# Patient Record
Sex: FEMALE | Race: WHITE | Employment: FULL TIME | URBAN - METROPOLITAN AREA
[De-identification: names, ages, dates, MRNs, and addresses within clinical notes are randomized per-mention and may not be internally consistent; named-entity substitution may affect disease eponyms.]

---

## 2021-07-14 LAB
C. TRACHOMATIS, EXTERNAL RESULT: NORMAL
N. GONORRHOEAE, EXTERNAL RESULT: NORMAL

## 2021-08-17 LAB
ABO, EXTERNAL RESULT: NORMAL
HEP B, EXTERNAL RESULT: NORMAL
HEPATITIS C ANTIBODY, EXTERNAL RESULT: NORMAL
HIV, EXTERNAL RESULT: NORMAL
RH FACTOR, EXTERNAL RESULT: NORMAL
RUBELLA TITER, EXTERNAL RESULT: NORMAL

## 2021-11-22 LAB — RPR, EXTERNAL RESULT: NORMAL

## 2022-03-03 ENCOUNTER — ANESTHESIA (OUTPATIENT)
Dept: LABOR AND DELIVERY | Age: 32
End: 2022-03-03
Payer: COMMERCIAL

## 2022-03-03 ENCOUNTER — ANESTHESIA EVENT (OUTPATIENT)
Dept: LABOR AND DELIVERY | Age: 32
End: 2022-03-03
Payer: COMMERCIAL

## 2022-03-03 ENCOUNTER — HOSPITAL ENCOUNTER (INPATIENT)
Age: 32
LOS: 3 days | Discharge: HOME | End: 2022-03-06
Attending: OBSTETRICS & GYNECOLOGY | Admitting: OBSTETRICS & GYNECOLOGY
Payer: COMMERCIAL

## 2022-03-03 VITALS — DIASTOLIC BLOOD PRESSURE: 64 MMHG | OXYGEN SATURATION: 100 % | SYSTOLIC BLOOD PRESSURE: 113 MMHG

## 2022-03-03 DIAGNOSIS — Z98.891 S/P PRIMARY LOW TRANSVERSE C-SECTION: Primary | ICD-10-CM

## 2022-03-03 LAB
ABO/RH: NORMAL
AMPHETAMINE SCREEN, URINE: NORMAL
ANTIBODY SCREEN: NORMAL
BARBITURATE SCREEN URINE: NORMAL
BASOPHILS ABSOLUTE: 0.1 K/UL (ref 0–0.2)
BASOPHILS RELATIVE PERCENT: 0.6 %
BENZODIAZEPINE SCREEN, URINE: NORMAL
BUPRENORPHINE URINE: NORMAL
CANNABINOID SCREEN URINE: NORMAL
COCAINE METABOLITE SCREEN URINE: NORMAL
EOSINOPHILS ABSOLUTE: 0.1 K/UL (ref 0–0.6)
EOSINOPHILS RELATIVE PERCENT: 0.7 %
HCT VFR BLD CALC: 35.9 % (ref 36–48)
HEMOGLOBIN: 12.4 G/DL (ref 12–16)
LYMPHOCYTES ABSOLUTE: 1.5 K/UL (ref 1–5.1)
LYMPHOCYTES RELATIVE PERCENT: 16.7 %
Lab: NORMAL
MCH RBC QN AUTO: 32.8 PG (ref 26–34)
MCHC RBC AUTO-ENTMCNC: 34.6 G/DL (ref 31–36)
MCV RBC AUTO: 95 FL (ref 80–100)
METHADONE SCREEN, URINE: NORMAL
MONOCYTES ABSOLUTE: 0.9 K/UL (ref 0–1.3)
MONOCYTES RELATIVE PERCENT: 10.6 %
NEUTROPHILS ABSOLUTE: 6.4 K/UL (ref 1.7–7.7)
NEUTROPHILS RELATIVE PERCENT: 71.4 %
OPIATE SCREEN URINE: NORMAL
OXYCODONE URINE: NORMAL
PDW BLD-RTO: 12.9 % (ref 12.4–15.4)
PH UA: 6
PHENCYCLIDINE SCREEN URINE: NORMAL
PLATELET # BLD: 164 K/UL (ref 135–450)
PMV BLD AUTO: 9.2 FL (ref 5–10.5)
PROPOXYPHENE SCREEN: NORMAL
RBC # BLD: 3.78 M/UL (ref 4–5.2)
SARS-COV-2, NAAT: NOT DETECTED
TOTAL SYPHILLIS IGG/IGM: NORMAL
WBC # BLD: 9 K/UL (ref 4–11)

## 2022-03-03 PROCEDURE — 3609079900 HC CESAREAN SECTION: Performed by: OBSTETRICS & GYNECOLOGY

## 2022-03-03 PROCEDURE — 6370000000 HC RX 637 (ALT 250 FOR IP): Performed by: OBSTETRICS & GYNECOLOGY

## 2022-03-03 PROCEDURE — 85025 COMPLETE CBC W/AUTO DIFF WBC: CPT

## 2022-03-03 PROCEDURE — 3700000000 HC ANESTHESIA ATTENDED CARE: Performed by: OBSTETRICS & GYNECOLOGY

## 2022-03-03 PROCEDURE — 6360000002 HC RX W HCPCS: Performed by: ANESTHESIOLOGY

## 2022-03-03 PROCEDURE — 2580000003 HC RX 258: Performed by: OBSTETRICS & GYNECOLOGY

## 2022-03-03 PROCEDURE — 96372 THER/PROPH/DIAG INJ SC/IM: CPT

## 2022-03-03 PROCEDURE — 6360000002 HC RX W HCPCS: Performed by: OBSTETRICS & GYNECOLOGY

## 2022-03-03 PROCEDURE — 86780 TREPONEMA PALLIDUM: CPT

## 2022-03-03 PROCEDURE — 86900 BLOOD TYPING SEROLOGIC ABO: CPT

## 2022-03-03 PROCEDURE — 86850 RBC ANTIBODY SCREEN: CPT

## 2022-03-03 PROCEDURE — 59025 FETAL NON-STRESS TEST: CPT

## 2022-03-03 PROCEDURE — 2500000003 HC RX 250 WO HCPCS: Performed by: NURSE ANESTHETIST, CERTIFIED REGISTERED

## 2022-03-03 PROCEDURE — 2709999900 HC NON-CHARGEABLE SUPPLY: Performed by: OBSTETRICS & GYNECOLOGY

## 2022-03-03 PROCEDURE — 7100000001 HC PACU RECOVERY - ADDTL 15 MIN: Performed by: OBSTETRICS & GYNECOLOGY

## 2022-03-03 PROCEDURE — 87635 SARS-COV-2 COVID-19 AMP PRB: CPT

## 2022-03-03 PROCEDURE — 2709999900 HC NON-CHARGEABLE SUPPLY

## 2022-03-03 PROCEDURE — 96374 THER/PROPH/DIAG INJ IV PUSH: CPT

## 2022-03-03 PROCEDURE — 1220000000 HC SEMI PRIVATE OB R&B

## 2022-03-03 PROCEDURE — 80307 DRUG TEST PRSMV CHEM ANLYZR: CPT

## 2022-03-03 PROCEDURE — 3700000001 HC ADD 15 MINUTES (ANESTHESIA): Performed by: OBSTETRICS & GYNECOLOGY

## 2022-03-03 PROCEDURE — 2500000003 HC RX 250 WO HCPCS: Performed by: OBSTETRICS & GYNECOLOGY

## 2022-03-03 PROCEDURE — 6360000002 HC RX W HCPCS: Performed by: NURSE ANESTHETIST, CERTIFIED REGISTERED

## 2022-03-03 PROCEDURE — 7100000000 HC PACU RECOVERY - FIRST 15 MIN: Performed by: OBSTETRICS & GYNECOLOGY

## 2022-03-03 PROCEDURE — 2580000003 HC RX 258: Performed by: NURSE ANESTHETIST, CERTIFIED REGISTERED

## 2022-03-03 PROCEDURE — 86901 BLOOD TYPING SEROLOGIC RH(D): CPT

## 2022-03-03 RX ORDER — NICOTINE 21 MG/24HR
1 PATCH, TRANSDERMAL 24 HOURS TRANSDERMAL DAILY
Status: DISCONTINUED | OUTPATIENT
Start: 2022-03-03 | End: 2022-03-06 | Stop reason: HOSPADM

## 2022-03-03 RX ORDER — BUPIVACAINE HYDROCHLORIDE 7.5 MG/ML
INJECTION, SOLUTION INTRASPINAL PRN
Status: DISCONTINUED | OUTPATIENT
Start: 2022-03-03 | End: 2022-03-03 | Stop reason: SDUPTHER

## 2022-03-03 RX ORDER — SODIUM CHLORIDE 9 MG/ML
25 INJECTION, SOLUTION INTRAVENOUS PRN
Status: DISCONTINUED | OUTPATIENT
Start: 2022-03-03 | End: 2022-03-06 | Stop reason: HOSPADM

## 2022-03-03 RX ORDER — LANOLIN ALCOHOL/MO/W.PET/CERES
1000 CREAM (GRAM) TOPICAL DAILY
COMMUNITY

## 2022-03-03 RX ORDER — SODIUM CHLORIDE 0.9 % (FLUSH) 0.9 %
5-40 SYRINGE (ML) INJECTION PRN
Status: DISCONTINUED | OUTPATIENT
Start: 2022-03-03 | End: 2022-03-06 | Stop reason: HOSPADM

## 2022-03-03 RX ORDER — SODIUM CHLORIDE, SODIUM LACTATE, POTASSIUM CHLORIDE, AND CALCIUM CHLORIDE .6; .31; .03; .02 G/100ML; G/100ML; G/100ML; G/100ML
1000 INJECTION, SOLUTION INTRAVENOUS PRN
Status: DISCONTINUED | OUTPATIENT
Start: 2022-03-03 | End: 2022-03-06 | Stop reason: HOSPADM

## 2022-03-03 RX ORDER — IBUPROFEN 800 MG/1
800 TABLET ORAL EVERY 8 HOURS PRN
Status: DISCONTINUED | OUTPATIENT
Start: 2022-03-03 | End: 2022-03-06 | Stop reason: HOSPADM

## 2022-03-03 RX ORDER — MULTIVIT-MIN/IRON/FOLIC ACID/K 18-600-40
CAPSULE ORAL
COMMUNITY

## 2022-03-03 RX ORDER — ONDANSETRON 2 MG/ML
4 INJECTION INTRAMUSCULAR; INTRAVENOUS EVERY 6 HOURS PRN
Status: DISCONTINUED | OUTPATIENT
Start: 2022-03-03 | End: 2022-03-06 | Stop reason: HOSPADM

## 2022-03-03 RX ORDER — FENTANYL CITRATE 50 UG/ML
INJECTION, SOLUTION INTRAMUSCULAR; INTRAVENOUS PRN
Status: DISCONTINUED | OUTPATIENT
Start: 2022-03-03 | End: 2022-03-03 | Stop reason: SDUPTHER

## 2022-03-03 RX ORDER — MORPHINE SULFATE 10 MG/ML
INJECTION, SOLUTION INTRAMUSCULAR; INTRAVENOUS PRN
Status: DISCONTINUED | OUTPATIENT
Start: 2022-03-03 | End: 2022-03-03 | Stop reason: SDUPTHER

## 2022-03-03 RX ORDER — KETOROLAC TROMETHAMINE 30 MG/ML
INJECTION, SOLUTION INTRAMUSCULAR; INTRAVENOUS PRN
Status: DISCONTINUED | OUTPATIENT
Start: 2022-03-03 | End: 2022-03-03 | Stop reason: SDUPTHER

## 2022-03-03 RX ORDER — OXYCODONE HYDROCHLORIDE AND ACETAMINOPHEN 5; 325 MG/1; MG/1
1 TABLET ORAL EVERY 4 HOURS PRN
Status: DISCONTINUED | OUTPATIENT
Start: 2022-03-03 | End: 2022-03-06 | Stop reason: HOSPADM

## 2022-03-03 RX ORDER — SODIUM CHLORIDE 0.9 % (FLUSH) 0.9 %
5-40 SYRINGE (ML) INJECTION EVERY 12 HOURS SCHEDULED
Status: DISCONTINUED | OUTPATIENT
Start: 2022-03-03 | End: 2022-03-06 | Stop reason: HOSPADM

## 2022-03-03 RX ORDER — SIMETHICONE 80 MG
80 TABLET,CHEWABLE ORAL EVERY 6 HOURS PRN
Status: DISCONTINUED | OUTPATIENT
Start: 2022-03-03 | End: 2022-03-06 | Stop reason: HOSPADM

## 2022-03-03 RX ORDER — NALOXONE HYDROCHLORIDE 0.4 MG/ML
0.4 INJECTION, SOLUTION INTRAMUSCULAR; INTRAVENOUS; SUBCUTANEOUS PRN
Status: DISCONTINUED | OUTPATIENT
Start: 2022-03-03 | End: 2022-03-06 | Stop reason: HOSPADM

## 2022-03-03 RX ORDER — EPHEDRINE SULFATE/0.9% NACL/PF 50 MG/5 ML
SYRINGE (ML) INTRAVENOUS PRN
Status: DISCONTINUED | OUTPATIENT
Start: 2022-03-03 | End: 2022-03-03 | Stop reason: SDUPTHER

## 2022-03-03 RX ORDER — DIPHENHYDRAMINE HYDROCHLORIDE 50 MG/ML
25 INJECTION INTRAMUSCULAR; INTRAVENOUS EVERY 6 HOURS PRN
Status: DISCONTINUED | OUTPATIENT
Start: 2022-03-03 | End: 2022-03-06 | Stop reason: HOSPADM

## 2022-03-03 RX ORDER — PRENATAL VIT/IRON FUM/FOLIC AC 27MG-0.8MG
1 TABLET ORAL DAILY
Status: DISCONTINUED | OUTPATIENT
Start: 2022-03-03 | End: 2022-03-06 | Stop reason: HOSPADM

## 2022-03-03 RX ORDER — DOCUSATE SODIUM 100 MG/1
100 CAPSULE, LIQUID FILLED ORAL 2 TIMES DAILY
Status: DISCONTINUED | OUTPATIENT
Start: 2022-03-03 | End: 2022-03-03

## 2022-03-03 RX ORDER — SODIUM CHLORIDE, SODIUM LACTATE, POTASSIUM CHLORIDE, AND CALCIUM CHLORIDE .6; .31; .03; .02 G/100ML; G/100ML; G/100ML; G/100ML
500 INJECTION, SOLUTION INTRAVENOUS PRN
Status: DISCONTINUED | OUTPATIENT
Start: 2022-03-03 | End: 2022-03-06 | Stop reason: HOSPADM

## 2022-03-03 RX ORDER — NALBUPHINE HCL 10 MG/ML
5 AMPUL (ML) INJECTION EVERY 4 HOURS PRN
Status: DISCONTINUED | OUTPATIENT
Start: 2022-03-03 | End: 2022-03-06 | Stop reason: HOSPADM

## 2022-03-03 RX ORDER — DOCUSATE SODIUM 100 MG/1
100 CAPSULE, LIQUID FILLED ORAL 2 TIMES DAILY
Status: DISCONTINUED | OUTPATIENT
Start: 2022-03-03 | End: 2022-03-06 | Stop reason: HOSPADM

## 2022-03-03 RX ORDER — METHYLERGONOVINE MALEATE 0.2 MG/ML
200 INJECTION INTRAVENOUS PRN
Status: DISCONTINUED | OUTPATIENT
Start: 2022-03-03 | End: 2022-03-06 | Stop reason: HOSPADM

## 2022-03-03 RX ORDER — KETOROLAC TROMETHAMINE 30 MG/ML
30 INJECTION, SOLUTION INTRAMUSCULAR; INTRAVENOUS EVERY 6 HOURS
Status: COMPLETED | OUTPATIENT
Start: 2022-03-03 | End: 2022-03-03

## 2022-03-03 RX ORDER — SODIUM CHLORIDE, SODIUM LACTATE, POTASSIUM CHLORIDE, CALCIUM CHLORIDE 600; 310; 30; 20 MG/100ML; MG/100ML; MG/100ML; MG/100ML
INJECTION, SOLUTION INTRAVENOUS CONTINUOUS
Status: DISCONTINUED | OUTPATIENT
Start: 2022-03-03 | End: 2022-03-06 | Stop reason: HOSPADM

## 2022-03-03 RX ORDER — SODIUM CHLORIDE, SODIUM LACTATE, POTASSIUM CHLORIDE, CALCIUM CHLORIDE 600; 310; 30; 20 MG/100ML; MG/100ML; MG/100ML; MG/100ML
INJECTION, SOLUTION INTRAVENOUS CONTINUOUS PRN
Status: DISCONTINUED | OUTPATIENT
Start: 2022-03-03 | End: 2022-03-03 | Stop reason: SDUPTHER

## 2022-03-03 RX ORDER — LANOLIN 100 %
OINTMENT (GRAM) TOPICAL
Status: DISCONTINUED | OUTPATIENT
Start: 2022-03-03 | End: 2022-03-06 | Stop reason: HOSPADM

## 2022-03-03 RX ORDER — OXYCODONE HYDROCHLORIDE AND ACETAMINOPHEN 5; 325 MG/1; MG/1
2 TABLET ORAL EVERY 4 HOURS PRN
Status: DISCONTINUED | OUTPATIENT
Start: 2022-03-03 | End: 2022-03-06 | Stop reason: HOSPADM

## 2022-03-03 RX ORDER — CALCIUM CARBONATE 500(1250)
500 TABLET ORAL DAILY
COMMUNITY

## 2022-03-03 RX ORDER — ONDANSETRON 2 MG/ML
INJECTION INTRAMUSCULAR; INTRAVENOUS PRN
Status: DISCONTINUED | OUTPATIENT
Start: 2022-03-03 | End: 2022-03-03 | Stop reason: SDUPTHER

## 2022-03-03 RX ORDER — OXYTOCIN 10 [USP'U]/ML
INJECTION, SOLUTION INTRAMUSCULAR; INTRAVENOUS PRN
Status: DISCONTINUED | OUTPATIENT
Start: 2022-03-03 | End: 2022-03-03 | Stop reason: SDUPTHER

## 2022-03-03 RX ADMIN — ONDANSETRON 4 MG: 2 INJECTION INTRAMUSCULAR; INTRAVENOUS at 05:00

## 2022-03-03 RX ADMIN — SODIUM CHLORIDE, SODIUM LACTATE, POTASSIUM CHLORIDE, AND CALCIUM CHLORIDE: .6; .31; .03; .02 INJECTION, SOLUTION INTRAVENOUS at 04:38

## 2022-03-03 RX ADMIN — SODIUM CHLORIDE, PRESERVATIVE FREE 10 ML: 5 INJECTION INTRAVENOUS at 23:12

## 2022-03-03 RX ADMIN — Medication 10 MG: at 05:04

## 2022-03-03 RX ADMIN — KETOROLAC TROMETHAMINE 30 MG: 30 INJECTION, SOLUTION INTRAMUSCULAR at 05:35

## 2022-03-03 RX ADMIN — NALBUPHINE HYDROCHLORIDE 5 MG: 10 INJECTION, SOLUTION INTRAMUSCULAR; INTRAVENOUS; SUBCUTANEOUS at 06:55

## 2022-03-03 RX ADMIN — CEFAZOLIN 2000 MG: 10 INJECTION, POWDER, FOR SOLUTION INTRAVENOUS at 04:38

## 2022-03-03 RX ADMIN — KETOROLAC TROMETHAMINE 30 MG: 30 INJECTION, SOLUTION INTRAMUSCULAR; INTRAVENOUS at 23:09

## 2022-03-03 RX ADMIN — Medication 10 MG: at 05:00

## 2022-03-03 RX ADMIN — DIPHENHYDRAMINE HYDROCHLORIDE 25 MG: 50 INJECTION INTRAMUSCULAR; INTRAVENOUS at 13:01

## 2022-03-03 RX ADMIN — SODIUM CHLORIDE, POTASSIUM CHLORIDE, SODIUM LACTATE AND CALCIUM CHLORIDE: 600; 310; 30; 20 INJECTION, SOLUTION INTRAVENOUS at 10:31

## 2022-03-03 RX ADMIN — BUPIVACAINE HYDROCHLORIDE IN DEXTROSE 1.7 ML: 7.5 INJECTION, SOLUTION SUBARACHNOID at 04:48

## 2022-03-03 RX ADMIN — OXYTOCIN 20 UNITS: 10 INJECTION, SOLUTION INTRAMUSCULAR; INTRAVENOUS at 05:13

## 2022-03-03 RX ADMIN — MORPHINE SULFATE 0.2 MG: 10 INJECTION INTRAVENOUS at 04:48

## 2022-03-03 RX ADMIN — HYDROMORPHONE HYDROCHLORIDE 1 MG: 1 INJECTION, SOLUTION INTRAMUSCULAR; INTRAVENOUS; SUBCUTANEOUS at 07:34

## 2022-03-03 RX ADMIN — FAMOTIDINE 20 MG: 10 INJECTION, SOLUTION INTRAVENOUS at 03:58

## 2022-03-03 RX ADMIN — Medication 87.3 MILLI-UNITS/MIN: at 06:06

## 2022-03-03 RX ADMIN — DOCUSATE SODIUM 100 MG: 100 CAPSULE, LIQUID FILLED ORAL at 10:07

## 2022-03-03 RX ADMIN — KETOROLAC TROMETHAMINE 30 MG: 30 INJECTION, SOLUTION INTRAMUSCULAR; INTRAVENOUS at 10:37

## 2022-03-03 RX ADMIN — PRENATAL VIT W/ FE FUMARATE-FA TAB 27-0.8 MG 1 TABLET: 27-0.8 TAB at 10:07

## 2022-03-03 RX ADMIN — SODIUM CHLORIDE, POTASSIUM CHLORIDE, SODIUM LACTATE AND CALCIUM CHLORIDE: 600; 310; 30; 20 INJECTION, SOLUTION INTRAVENOUS at 06:03

## 2022-03-03 RX ADMIN — DOCUSATE SODIUM 100 MG: 100 CAPSULE, LIQUID FILLED ORAL at 23:10

## 2022-03-03 RX ADMIN — ENOXAPARIN SODIUM 40 MG: 100 INJECTION SUBCUTANEOUS at 18:21

## 2022-03-03 RX ADMIN — ONDANSETRON 4 MG: 2 INJECTION INTRAMUSCULAR; INTRAVENOUS at 11:46

## 2022-03-03 RX ADMIN — FENTANYL CITRATE 10 MCG: 50 INJECTION INTRAMUSCULAR; INTRAVENOUS at 04:48

## 2022-03-03 RX ADMIN — KETOROLAC TROMETHAMINE 30 MG: 30 INJECTION, SOLUTION INTRAMUSCULAR; INTRAVENOUS at 16:52

## 2022-03-03 RX ADMIN — SODIUM CHLORIDE, SODIUM LACTATE, POTASSIUM CHLORIDE, AND CALCIUM CHLORIDE: .6; .31; .03; .02 INJECTION, SOLUTION INTRAVENOUS at 05:46

## 2022-03-03 ASSESSMENT — PULMONARY FUNCTION TESTS
PIF_VALUE: 0
PIF_VALUE: 1
PIF_VALUE: 0

## 2022-03-03 ASSESSMENT — PAIN DESCRIPTION - DESCRIPTORS: DESCRIPTORS: ACHING;BURNING;CRAMPING

## 2022-03-03 ASSESSMENT — PAIN SCALES - GENERAL
PAINLEVEL_OUTOF10: 5
PAINLEVEL_OUTOF10: 5
PAINLEVEL_OUTOF10: 6
PAINLEVEL_OUTOF10: 3
PAINLEVEL_OUTOF10: 3
PAINLEVEL_OUTOF10: 5

## 2022-03-03 ASSESSMENT — PAIN - FUNCTIONAL ASSESSMENT: PAIN_FUNCTIONAL_ASSESSMENT: ACTIVITIES ARE NOT PREVENTED

## 2022-03-03 ASSESSMENT — PAIN DESCRIPTION - PROGRESSION: CLINICAL_PROGRESSION: NOT CHANGED

## 2022-03-03 ASSESSMENT — PAIN DESCRIPTION - ORIENTATION: ORIENTATION: LEFT;LOWER

## 2022-03-03 ASSESSMENT — PAIN DESCRIPTION - PAIN TYPE: TYPE: ACUTE PAIN;SURGICAL PAIN

## 2022-03-03 ASSESSMENT — PAIN DESCRIPTION - LOCATION: LOCATION: ABDOMEN

## 2022-03-03 ASSESSMENT — PAIN DESCRIPTION - ONSET: ONSET: ON-GOING

## 2022-03-03 ASSESSMENT — PAIN DESCRIPTION - FREQUENCY: FREQUENCY: CONTINUOUS

## 2022-03-03 NOTE — FLOWSHEET NOTE
Report given to DENICE Richardson RNC from this nurse and Lynda Gonzales RN. Care turned over at this time.

## 2022-03-03 NOTE — ANESTHESIA PROCEDURE NOTES
Spinal Block    Patient location during procedure: OR  Start time: 3/3/2022 4:40 AM  End time: 3/3/2022 4:59 AM  Reason for block: primary anesthetic  Staffing  Performed: resident/CRNA   Anesthesiologist: Himanshu Proctor MD  Resident/CRNA: PANCHITO Vargas CRNA  Preanesthetic Checklist  Completed: patient identified, IV checked, site marked, risks and benefits discussed, surgical consent, monitors and equipment checked, pre-op evaluation, timeout performed, anesthesia consent given, oxygen available and patient being monitored  Spinal Block  Patient position: sitting  Prep: ChloraPrep and site prepped and draped  Patient monitoring: frequent blood pressure checks and continuous pulse ox  Approach: midline  Location: L2/L3  Provider prep: mask and sterile gloves  Local infiltration: lidocaine  Agent: bupivacaine  Adjuvant medications: duramorph 0.2mg/fentanyl 10mcg. Dose: 1.7  Dose: 1.7  Needle  Needle type:  Jenny   Needle gauge: 25 G  Needle length: 3.5 in  Assessment  Sensory level: T4  Swirl obtained: Yes  CSF: clear  Attempts: 1  Hemodynamics: stable

## 2022-03-03 NOTE — FLOWSHEET NOTE
Dr Tanna Best in, bedside ultrasound performed to determine fetal position. Will proceed with primary C/S for breech presentation.

## 2022-03-03 NOTE — ANESTHESIA POSTPROCEDURE EVALUATION
Department of Anesthesiology  Postprocedure Note    Patient: Justyna Pham  MRN: [id number]  [place]: 1990  Date of evaluation: 3/3/2022  Time:  6:00 AM     Procedure Summary     Date: 22 Room / Location: [facility] [address]    Anesthesia Start: 338 Anesthesia Stop: Karissa Budds    Procedure: PRIMARY  SECTION with low transverse uterine incision at 0511 (N/A ) Diagnosis: (Primary , , breech presentation, Gest.39)    Surgeons: Halle Gomez MD Responsible Provider: Kailyn Osborne MD    Anesthesia Type: spinal ASA Status: 1          Anesthesia Type: spinal    Vy Phase I:      Vy Phase II:      Last vitals: Reviewed and per EMR flowsheets.        Anesthesia Post Evaluation    Patient location during evaluation: PACU  Patient participation: complete - patient participated  Level of consciousness: awake and alert  Pain score: 0  Airway patency: patent  Nausea & Vomiting: no vomiting and no nausea  Complications: no  Cardiovascular status: blood pressure returned to baseline and hemodynamically stable  Respiratory status: acceptable and room air  Hydration status: stable

## 2022-03-03 NOTE — FLOWSHEET NOTE
Pt to [facility] triage with SROM at 0109 with clear fluid noted. Pt states feeling occasional mild irregular contraction, denies any vaginal bleeding. Pt states feeling good fetal movement. Pt placed on monitor, viewed at central station. Admission and initial assessment completed. SVE performed, cervix 1/60/-2 posterior. Dr Alfredo Renae notified of pt status, will be in to evaluate.

## 2022-03-03 NOTE — L&D DELIVERY NOTE
OPERATIVE NOTE    Date of surgery: 2022    Prenatal Care: Complicated by: 1. Rito breech presentation   Pre-operative Diagnosis: 1. IUP @ 39 0/7 w 2. Pre-labor PROM   Post-operative Diagnosis: the same   Procedure: Urgent primary   Anesthesia: Spinal  Surgeon: Dr Kylie Carias  Assistant: Hunter Andersen  Antibiotics: Ancef  : Male, weight: 3390 g, Apgars 9/9  Findings: Normal uterus, tubes, ovaries  Complications: None  Specimens: None  Urine Output: 50 ml, clear   Quantified blood loss: 655 ml    Indications: Patient is Armando Keita  is a 27 y.o.  female at 39w0d was admitted to L&D a few hours prior her planned CS due to pre-labor PROM. The risks, benefits and alternatives of  section were discussed with the patient, including but not limited to infection, allergic reaction, disfiguring scar, thrombosis, injury to bowel, bladder, fistula, injury to blood vessels, hysterectomy, injury to fetus, need for blood transfusion, pelvic pain, adhesive disease or scar tissue, embolism, herniation of incision site, and risk of repeat cesareans / trial of labor after . Patient elected to proceed, informed consent was signed. Procedure Details: The patient was taken to the operating room, placed in the supine position with the leftward tilt. Spinal anesthesia was found to be adequate. Garcia catheter was placed in the bladder. The patient was prepped and draped in the normal sterile fashion. Time out was performed, identifying correct patients name, date of birth, and type of the procedure. Pfannenstiel skin incision was made with the scalpel and carried down to the underlying fascia. The fascial was incised and incision was extended laterally with Guerrero scissors. The superior fascial incision was grasped with Kocher clamps, tented up, and the underlying rectus muscles were dissected bluntly.  The inferior edge of the rectus fascia was grasped with Kocher clamps, tented up, and the underlying rectus muscle was dissected off bluntly. The rectus muscle was divided in the midline bluntly. The peritoneum was entered bluntly with hemostat and extended inferiorly and superiorly with Metzenbaum scissors. The César retractor was then inserted. The vesicouterine peritoneum was identified, grasped with pick-ups and entered sharply with Metzenbaum scissors. The bladder flap was then created digitally. A low transverse uterine incision was made with the scalpel and extended laterally with blunt finger dissection. The baby was delivered atraumatically in breech presentation, following appropriate maneuvers. Single nuchal cord was released, the nose and mouth were suctioned. The cord was clamped and cut and the baby was handed off to the waiting nursing staff. Placenta was then delivered manually. The uterus was not  exteriorized and it was cleared of all clots and debris. The uterine incision was closed in two layers. The first layer with running locked layer of 0 Monocryl. The second layer was an imbricating layer of 0 Monocryl with good hemostasis assured. The paracolic gutters were cleaned with moistened laps, removing blood clots and debris. Good hemostasis was again reassured throughout. The César retractor was then removed from the abdomen. The peritoneum was closed with 3-0 Vicryl in a running fashion after first and second lap and instrument count were correct. The rectus muscles were approximated with 4 interrupted matress sutures of O-Vycril to prevent future diastasis. The fascia was closed with 0 Vicryl in a running fashion. Good hemostasis was assured. The subcuticular layers were irrigated with warm normal saline and bleeding controlled with Bovie cautery. The subcuticular layer was reapproximated with 3-0 Vicryl in interrupted fashion. The skin was closed with a 4-0 Vicryl in a subcuticular fashion. Derma bond was applied to skin. The patient tolerated the procedure well.  Sponge, lap, and needle counts were correct times three and the patient was taken to recovery in stable condition.     Electronically signed by Jules Mirza MD on 3/3/2022 at 5:55 AM

## 2022-03-03 NOTE — FLOWSHEET NOTE
Patient prepped for OR. Chlorhexidine wipes used to prep incision area. IV fluids running. Consents signed. Witnessed and placed in chart. All preop medications administered as prescribed. All patient questions and concerns addressed. Patient out of room to OR for primary  section per Dr Angela Parra at this time.

## 2022-03-03 NOTE — PLAN OF CARE
Problem: Pain:  Goal: Pain level will decrease  Description: Pain level will decrease  Outcome: Ongoing  Goal: Control of acute pain  Description: Control of acute pain  Outcome: Ongoing  Goal: Control of chronic pain  Description: Control of chronic pain  Outcome: Ongoing     Problem: Anxiety:  Goal: Level of anxiety will decrease  Description: Level of anxiety will decrease  Outcome: Ongoing     Problem: Aspiration - Risk of:  Goal: Absence of aspiration  Description: Absence of aspiration  Outcome: Ongoing     Problem: Tissue Perfusion - Uteroplacental, Altered:  Goal: Absence of abnormal fetal heart rate pattern  Description: Absence of abnormal fetal heart rate pattern  Outcome: Ongoing     Problem: Venous Thromboembolism - Risk of:  Goal: Will show no signs or symptoms of venous thromboembolism  Description: Will show no signs or symptoms of venous thromboembolism  Outcome: Ongoing     Problem: Discharge Planning:  Goal: Discharged to appropriate level of care  Description: Discharged to appropriate level of care  Outcome: Ongoing     Problem: Fluid Volume - Imbalance:  Goal: Absence of postpartum hemorrhage signs and symptoms  Description: Absence of postpartum hemorrhage signs and symptoms  Outcome: Ongoing  Goal: Absence of imbalanced fluid volume signs and symptoms  Description: Absence of imbalanced fluid volume signs and symptoms  Outcome: Ongoing     Problem: Infection - Surgical Site:  Goal: Will show no infection signs and symptoms  Description: Will show no infection signs and symptoms  Outcome: Ongoing     Problem: Mood - Altered:  Goal: Mood stable  Description: Mood stable  Outcome: Ongoing     Problem: Nausea/Vomiting:  Goal: Absence of nausea/vomiting  Description: Absence of nausea/vomiting  Outcome: Ongoing     Problem: Pain - Acute:  Goal: Pain level will decrease  Description: Pain level will decrease  Outcome: Ongoing     Problem: Urinary Retention:  Goal: Urinary elimination within

## 2022-03-03 NOTE — ANESTHESIA PRE PROCEDURE
Department of Anesthesiology  Preprocedure Note       Name:  Corrie North   Age:  27 y.o.  :  1990                                          MRN:  [id number]         Date:  3/3/2022      Surgeon: Toams Hernandez):  Jonh Quinn MD    Procedure: Procedure(s):   SECTION    Medications prior to admission:   Prior to Admission medications    Medication Sig Start Date End Date Taking? Authorizing Provider   Prenatal MV-Min-Fe Fum-FA-DHA (PRENATAL 1 PO) Take by mouth   Yes Historical Provider, MD   Cholecalciferol (VITAMIN D) 50 MCG ( UT) CAPS capsule Take by mouth   Yes Historical Provider, MD   vitamin B-12 (CYANOCOBALAMIN) 1000 MCG tablet Take 1,000 mcg by mouth daily   Yes Historical Provider, MD   calcium carbonate (OSCAL) 500 MG TABS tablet Take 500 mg by mouth daily   Yes Historical Provider, MD       Current medications:    Current Facility-Administered Medications   Medication Dose Route Frequency Provider Last Rate Last Admin    lactated ringers infusion   IntraVENous Continuous Inis Fails, MD        lactated ringers bolus  500 mL IntraVENous PRN Inis Fails, MD Ady Stokes lactated ringers bolus  1,000 mL IntraVENous PRN Inis Fails, MD        oxytocin (PITOCIN) 30 units in 500 mL infusion  87.3 walter-units/min IntraVENous Continuous PRN Inis Fails, MD        And    oxytocin (PITOCIN) 30 units in 500 mL infusion  10 Units IntraVENous PRN Inis Fails, MD        docusate sodium (COLACE) capsule 100 mg  100 mg Oral BID Inis Fails, MD        ceFAZolin (ANCEF) 2000 mg in dextrose 5 % 100 mL IVPB  2,000 mg IntraVENous Once Inis Fails, MD        famotidine (PEPCID) injection 20 mg  20 mg IntraVENous Once Inis Fails, MD           Allergies:  No Known Allergies    Problem List:    Patient Active Problem List   Diagnosis Code    S/P primary low transverse  Q71.894       Past Medical History:  History reviewed.  No pertinent past medical history. Past Surgical History:  History reviewed. No pertinent surgical history. Social History:    Social History     Tobacco Use    Smoking status: Former Smoker    Smokeless tobacco: Never Used   Substance Use Topics    Alcohol use: Not Currently                                Counseling given: Not Answered      Vital Signs (Current):   Vitals:    03/03/22 0306   BP: 119/76   Pulse: 59   Resp: 18   Temp: 36.6 °C (97.8 °F)   TempSrc: Oral   SpO2: 100%   Weight: 158 lb (71.7 kg)   Height: 5' 8\" (1.727 m)                                              BP Readings from Last 3 Encounters:   03/03/22 119/76       NPO Status:  Nothing since 2000 3-2-22                                                                               BMI:   Wt Readings from Last 3 Encounters:   03/03/22 158 lb (71.7 kg)     Body mass index is 24.02 kg/m². CBC:   Lab Results   Component Value Date    WBC 9.0 03/03/2022    RBC 3.78 03/03/2022    HGB 12.4 03/03/2022    HCT 35.9 03/03/2022    MCV 95.0 03/03/2022    RDW 12.9 03/03/2022     03/03/2022       CMP: No results found for: NA, K, CL, CO2, BUN, CREATININE, GFRAA, AGRATIO, LABGLOM, GLUCOSE, GLU, PROT, CALCIUM, BILITOT, ALKPHOS, AST, ALT    POC Tests: No results for input(s): POCGLU, POCNA, POCK, POCCL, POCBUN, POCHEMO, POCHCT in the last 72 hours.     Coags: No results found for: PROTIME, INR, APTT    HCG (If Applicable): No results found for: PREGTESTUR, PREGSERUM, HCG, HCGQUANT     ABGs: No results found for: PHART, PO2ART, OTW1PRV, SGR9YYN, BEART, W2NZIYVA     Type & Screen (If Applicable):  No results found for: LABABO, LABRH    Drug/Infectious Status (If Applicable):  No results found for: HIV, HEPCAB    COVID-19 Screening (If Applicable): No results found for: COVID19        Anesthesia Evaluation  Patient summary reviewed no history of anesthetic complications:   Airway: Mallampati: I  TM distance: >3 FB   Neck ROM: full  Mouth opening: > = 3 FB Dental: normal exam         Pulmonary:Negative Pulmonary ROS and normal exam                               Cardiovascular:Negative CV ROS  Exercise tolerance: good (>4 METS),           Rhythm: regular  Rate: normal           Beta Blocker:  Not on Beta Blocker         Neuro/Psych:                ROS comment: Some low back pain, prior injection for steroid with good result. No prior surgery. No numbness or pain since 2015 GI/Hepatic/Renal: Neg GI/Hepatic/Renal ROS            Endo/Other: Negative Endo/Other ROS                    Abdominal:             Vascular: negative vascular ROS. Other Findings:             Anesthesia Plan      spinal     ASA 1           MIPS: Postoperative opioids intended and Prophylactic antiemetics administered. Anesthetic plan and risks discussed with patient. Use of blood products discussed with patient whom consented to blood products. Plan discussed with attending.                   PANCHITO Key - CRNA   3/3/2022

## 2022-03-03 NOTE — FLOWSHEET NOTE
Nurse at bedside, IV pump alarming for PIT, bag has approximately 1/4 of bag left pump alarming infusion complete, per YAHIR Owens [facility] charge nurse to discontinue line and infusion is complete.

## 2022-03-03 NOTE — PLAN OF CARE
Problem: Pain:  Goal: Pain level will decrease  Description: Pain level will decrease  3/3/2022 0704 by Marylin Eldridge RN  Outcome: Completed  3/3/2022 0614 by Marylin Eldridge RN  Outcome: Ongoing  Goal: Control of acute pain  Description: Control of acute pain  3/3/2022 0704 by Marylin Eldridge RN  Outcome: Completed  3/3/2022 0614 by Marylin Eldridge RN  Outcome: Ongoing  Goal: Control of chronic pain  Description: Control of chronic pain  3/3/2022 0704 by Marylin Eldridge RN  Outcome: Completed  3/3/2022 0614 by Marylin Eldridge RN  Outcome: Ongoing     Problem: Anxiety:  Goal: Level of anxiety will decrease  Description: Level of anxiety will decrease  3/3/2022 0704 by Marylin Eldridge RN  Outcome: Completed  3/3/2022 0614 by Marylin Eldridge RN  Outcome: Ongoing     Problem: Aspiration - Risk of:  Goal: Absence of aspiration  Description: Absence of aspiration  3/3/2022 0704 by Marylin Eldridge RN  Outcome: Completed  3/3/2022 0614 by Marylin Eldridge RN  Outcome: Ongoing     Problem: Tissue Perfusion - Uteroplacental, Altered:  Goal: Absence of abnormal fetal heart rate pattern  Description: Absence of abnormal fetal heart rate pattern  3/3/2022 0704 by Marylin Eldridge RN  Outcome: Completed  3/3/2022 0614 by Marylin Eldridge RN  Outcome: Ongoing     Problem: Venous Thromboembolism - Risk of:  Goal: Will show no signs or symptoms of venous thromboembolism  Description: Will show no signs or symptoms of venous thromboembolism  3/3/2022 0704 by Marylin Eldridge RN  Outcome: Completed  3/3/2022 0614 by Marylin Eldridge RN  Outcome: Ongoing     Problem: Discharge Planning:  Goal: Discharged to appropriate level of care  Description: Discharged to appropriate level of care  3/3/2022 0704 by Marylin Eldridge RN  Outcome: Completed  3/3/2022 0614 by Marylin Eldridge RN  Outcome: Ongoing  Goal: Discharged to appropriate level of care  Description: Discharged to appropriate level of care  3/3/2022 1737 by Anish Cox RN  Outcome: Ongoing  3/3/2022 0900 by Bethany Richardson RN  Outcome: Ongoing     Problem: Fluid Volume - Imbalance:  Goal: Absence of postpartum hemorrhage signs and symptoms  Description: Absence of postpartum hemorrhage signs and symptoms  3/3/2022 0704 by Bello Ballesteros RN  Outcome: Completed  3/3/2022 0614 by Bello Ballesteros RN  Outcome: Ongoing  Goal: Absence of imbalanced fluid volume signs and symptoms  Description: Absence of imbalanced fluid volume signs and symptoms  3/3/2022 0704 by Bello Ballesteros RN  Outcome: Completed  3/3/2022 0614 by Bello Ballesteros RN  Outcome: Ongoing  Goal: Absence of postpartum hemorrhage signs and symptoms  Description: Absence of postpartum hemorrhage signs and symptoms  3/3/2022 1737 by Azael Muhammad RN  Outcome: Ongoing  3/3/2022 0900 by Giselle Lyons RN  Outcome: Ongoing  Goal: Absence of imbalanced fluid volume signs and symptoms  Description: Absence of imbalanced fluid volume signs and symptoms  3/3/2022 1737 by Azael Muhammad RN  Outcome: Ongoing  3/3/2022 0900 by Giselle Lyons RN  Outcome: Ongoing     Problem: Infection - Surgical Site:  Goal: Will show no infection signs and symptoms  Description: Will show no infection signs and symptoms  3/3/2022 0704 by Bello Ballesteros RN  Outcome: Completed  3/3/2022 0614 by Bello Ballesteros RN  Outcome: Ongoing  Goal: Will show no infection signs and symptoms  Description: Will show no infection signs and symptoms  3/3/2022 1737 by Azael Muhammad RN  Outcome: Ongoing  3/3/2022 0900 by Giselle Lyons RN  Outcome: Ongoing     Problem: Mood - Altered:  Goal: Mood stable  Description: Mood stable  3/3/2022 0704 by Bello Ballesteros RN  Outcome: Completed  3/3/2022 0614 by Bello Ballesteros RN  Outcome: Ongoing  Goal: Mood stable  Description: Mood stable  3/3/2022 1737 by Azael Muhammad RN  Outcome: Ongoing  3/3/2022 0900 by Giselle Lyons RN  Outcome: Ongoing     Problem: Nausea/Vomiting:  Goal: Absence of nausea/vomiting  Description: Absence of nausea/vomiting  3/3/2022 0704 by Bello Ballesteros RN  Outcome: Completed  3/3/2022 0614 by Dana Storm RN  Outcome: Ongoing  Goal: Absence of nausea/vomiting  Description: Absence of nausea/vomiting  3/3/2022 1737 by Stephanie Beck RN  Outcome: Ongoing  3/3/2022 0900 by Salvador Ibarra RN  Outcome: Ongoing     Problem: Pain - Acute:  Goal: Pain level will decrease  Description: Pain level will decrease  3/3/2022 0704 by Dana Storm RN  Outcome: Completed  3/3/2022 0614 by Dana Storm RN  Outcome: Ongoing  Goal: Pain level will decrease  Description: Pain level will decrease  3/3/2022 1737 by Stephanie Beck RN  Outcome: Ongoing  3/3/2022 0900 by Salvador Ibarra RN  Outcome: Ongoing     Problem: Urinary Retention:  Goal: Urinary elimination within specified parameters  Description: Urinary elimination within specified parameters  3/3/2022 0704 by Dana Storm RN  Outcome: Completed  3/3/2022 0614 by Dana Storm RN  Outcome: Ongoing  Goal: Urinary elimination within specified parameters  Description: Urinary elimination within specified parameters  3/3/2022 1737 by Stephanie Beck RN  Outcome: Ongoing  3/3/2022 0900 by Salvador Ibarra RN  Outcome: Ongoing     Problem: Venous Thromboembolism:  Goal: Will show no signs or symptoms of venous thromboembolism  Description: Will show no signs or symptoms of venous thromboembolism  3/3/2022 0704 by Dana Storm RN  Outcome: Completed  3/3/2022 0614 by Dana Storm RN  Outcome: Ongoing  Goal: Absence of signs or symptoms of impaired coagulation  Description: Absence of signs or symptoms of impaired coagulation  3/3/2022 0704 by Dana Storm RN  Outcome: Completed  3/3/2022 0614 by Dana Storm RN  Outcome: Ongoing  Goal: Will show no signs or symptoms of venous thromboembolism  Description: Will show no signs or symptoms of venous thromboembolism  3/3/2022 1737 by Stephanie Beck RN  Outcome: Ongoing  3/3/2022 0900 by Salvador Ibarra RN  Outcome: Ongoing  Goal: Absence of signs or symptoms of impaired coagulation  Description: Absence of signs or symptoms of impaired coagulation  3/3/2022 1737 by Cinthya Winslow RN  Outcome: Ongoing  3/3/2022 0900 by Iram Chirinos RN  Outcome: Ongoing

## 2022-03-03 NOTE — FLOWSHEET NOTE
Garcia care with soap and water provided by this RN. Mesh underwear and pad placed on client and assisted up to chair. Tolerated well. Bed pad changed and patient gown. Tolerated care well.

## 2022-03-03 NOTE — H&P
Department of Obstetrics and Gynecology   Obstetrics History and Physical        CHIEF COMPLAINT: Pre labor rupture of membranes     HISTORY OF PRESENT ILLNESS:    Klever Rosario  is a 27 y.o.  female at 39w0d presents with a chief complaint as above and is being admitted for Primary CS due to breech presentation     Estimated Due Date: Estimated Date of Delivery: 3/10/22    PRENATAL CARE: Complicated by: Nawaf Franco breech     PAST OB HISTORY:  OB History        1    Para        Term                AB        Living           SAB        IAB        Ectopic        Molar        Multiple        Live Births                  Past Medical History:    History reviewed. No pertinent past medical history. Past Surgical History:    History reviewed. No pertinent surgical history. Allergies:  Patient has no known allergies. Social History:    Social History     Socioeconomic History    Marital status:      Spouse name: Not on file    Number of children: Not on file    Years of education: Not on file    Highest education level: Not on file   Occupational History    Not on file   Tobacco Use    Smoking status: Former Smoker    Smokeless tobacco: Never Used   Substance and Sexual Activity    Alcohol use: Not Currently    Drug use: Never    Sexual activity: Yes     Partners: Male   Other Topics Concern    Not on file   Social History Narrative    Not on file     Social Determinants of Health     Financial Resource Strain:     Difficulty of Paying Living Expenses: Not on file   Food Insecurity:     Worried About Running Out of Food in the Last Year: Not on file    Orly of Food in the Last Year: Not on file   Transportation Needs:     Lack of Transportation (Medical): Not on file    Lack of Transportation (Non-Medical):  Not on file   Physical Activity:     Days of Exercise per Week: Not on file    Minutes of Exercise per Session: Not on file   Stress:     Feeling of Stress : Not on file Social Connections:     Frequency of Communication with Friends and Family: Not on file    Frequency of Social Gatherings with Friends and Family: Not on file    Attends [employer] Services: Not on file    Active Member of Clubs or Organizations: Not on file    Attends Club or Organization Meetings: Not on file    Marital Status: Not on file   Intimate Partner Violence:     Fear of Current or Ex-Partner: Not on file    Emotionally Abused: Not on file    Physically Abused: Not on file    Sexually Abused: Not on file   Housing Stability:     Unable to Pay for Housing in the Last Year: Not on file    Number of [place] in the Last Year: Not on file    Unstable Housing in the Last Year: Not on file     Family History:   History reviewed. No pertinent family history. Medications Prior to Admission:  Medications Prior to Admission: Prenatal MV-Min-Fe Fum-FA-DHA (PRENATAL 1 PO), Take by mouth  Cholecalciferol (VITAMIN D) 50 MCG (2000 UT) CAPS capsule, Take by mouth  vitamin B-12 (CYANOCOBALAMIN) 1000 MCG tablet, Take 1,000 mcg by mouth daily  calcium carbonate (OSCAL) 500 MG TABS tablet, Take 500 mg by mouth daily    REVIEW OF SYSTEMS:  negative     PHYSICAL EXAM:    Vitals:    03/03/22 0306   BP: 119/76   Pulse: 59   Resp: 18   Temp: 97.8 °F (36.6 °C)   TempSrc: Oral   SpO2: 100%   Weight: 158 lb (71.7 kg)   Height: 5' 8\" (1.727 m)     General appearance:  awake, alert, cooperative, no apparent distress, and appears stated age  Neurologic:  Awake, alert, oriented to name, place and time.     Lungs:  No increased work of breathing, good air exchange  Abdomen:  Soft, non tender, gravid, fundal height consistent with the gestational age, EFW by Leopold's maneuvers is 3200 grams  Pelvis:  Adequate pelvis  Cervix: 1/50/-3, breech  Contraction frequency: every 5 minutes  Membranes:  Ruptured clear fluid  Labs:   CBC:   Lab Results   Component Value Date    WBC 9.0 03/03/2022    RBC 3.78 03/03/2022    HGB 12.4 03/03/2022    HCT 35.9 03/03/2022    MCV 95.0 03/03/2022    MCH 32.8 03/03/2022    MCHC 34.6 03/03/2022    RDW 12.9 03/03/2022     03/03/2022    MPV 9.2 03/03/2022     BSUS: sen breech, confirmed by Dr Ady Haas    ASSESSMENT: 26 yo G1 @ 39 0/7    1. Pre-labor PROM  2. Kathee Ke breech presentation      PLAN: Admit for primary CS. Discussed with pt RBC of surgery, including risk of infection, bleeding, TTN, anesthesia complications and more, and she agreed to proceed, informed consent was signed and all Q re-surgery and recovery were answered   Labor: Routine labor orders  Fetus: Reassuring  GBS: Negative    I have presented reasonable alternatives to the patient's proposed care, treatment, and services. The discussion I have done encompassed risks, benefits, and side effects related to the alternatives and the risks related to not receiving the proposed care, treatment, and services. All questions answered. Patient wishes to proceed. The surgical site was confirmed by the patient and me.       Electronically signed by Nhi Rossi MD on 3/3/2022 at 4:36 AM

## 2022-03-04 LAB
HCT VFR BLD CALC: 32.2 % (ref 36–48)
HEMOGLOBIN: 11.1 G/DL (ref 12–16)
MCH RBC QN AUTO: 33 PG (ref 26–34)
MCHC RBC AUTO-ENTMCNC: 34.5 G/DL (ref 31–36)
MCV RBC AUTO: 95.8 FL (ref 80–100)
PDW BLD-RTO: 13 % (ref 12.4–15.4)
PLATELET # BLD: 159 K/UL (ref 135–450)
PMV BLD AUTO: 9.2 FL (ref 5–10.5)
RBC # BLD: 3.36 M/UL (ref 4–5.2)
WBC # BLD: 11 K/UL (ref 4–11)

## 2022-03-04 PROCEDURE — 36415 COLL VENOUS BLD VENIPUNCTURE: CPT

## 2022-03-04 PROCEDURE — 85027 COMPLETE CBC AUTOMATED: CPT

## 2022-03-04 PROCEDURE — 6370000000 HC RX 637 (ALT 250 FOR IP): Performed by: OBSTETRICS & GYNECOLOGY

## 2022-03-04 PROCEDURE — 6360000002 HC RX W HCPCS: Performed by: OBSTETRICS & GYNECOLOGY

## 2022-03-04 PROCEDURE — 96376 TX/PRO/DX INJ SAME DRUG ADON: CPT

## 2022-03-04 PROCEDURE — 1220000000 HC SEMI PRIVATE OB R&B

## 2022-03-04 PROCEDURE — 96372 THER/PROPH/DIAG INJ SC/IM: CPT

## 2022-03-04 RX ORDER — OXYCODONE HYDROCHLORIDE AND ACETAMINOPHEN 5; 325 MG/1; MG/1
1 TABLET ORAL ONCE
Status: COMPLETED | OUTPATIENT
Start: 2022-03-04 | End: 2022-03-04

## 2022-03-04 RX ADMIN — OXYCODONE AND ACETAMINOPHEN 1 TABLET: 5; 325 TABLET ORAL at 17:51

## 2022-03-04 RX ADMIN — IBUPROFEN 800 MG: 800 TABLET, FILM COATED ORAL at 09:13

## 2022-03-04 RX ADMIN — PRENATAL VIT W/ FE FUMARATE-FA TAB 27-0.8 MG 1 TABLET: 27-0.8 TAB at 09:06

## 2022-03-04 RX ADMIN — OXYCODONE AND ACETAMINOPHEN 1 TABLET: 5; 325 TABLET ORAL at 22:19

## 2022-03-04 RX ADMIN — DOCUSATE SODIUM 100 MG: 100 CAPSULE, LIQUID FILLED ORAL at 09:06

## 2022-03-04 RX ADMIN — ENOXAPARIN SODIUM 40 MG: 100 INJECTION SUBCUTANEOUS at 17:52

## 2022-03-04 RX ADMIN — IBUPROFEN 800 MG: 800 TABLET, FILM COATED ORAL at 16:31

## 2022-03-04 RX ADMIN — OXYCODONE AND ACETAMINOPHEN 1 TABLET: 5; 325 TABLET ORAL at 16:16

## 2022-03-04 RX ADMIN — DOCUSATE SODIUM 100 MG: 100 CAPSULE, LIQUID FILLED ORAL at 22:19

## 2022-03-04 ASSESSMENT — PAIN SCALES - GENERAL
PAINLEVEL_OUTOF10: 3
PAINLEVEL_OUTOF10: 5
PAINLEVEL_OUTOF10: 0
PAINLEVEL_OUTOF10: 5
PAINLEVEL_OUTOF10: 3
PAINLEVEL_OUTOF10: 1
PAINLEVEL_OUTOF10: 6
PAINLEVEL_OUTOF10: 6
PAINLEVEL_OUTOF10: 4

## 2022-03-04 ASSESSMENT — PAIN DESCRIPTION - DESCRIPTORS
DESCRIPTORS: CRAMPING
DESCRIPTORS: ACHING;DISCOMFORT
DESCRIPTORS: ACHING
DESCRIPTORS: ACHING;CRAMPING
DESCRIPTORS: CRAMPING

## 2022-03-04 ASSESSMENT — PAIN - FUNCTIONAL ASSESSMENT
PAIN_FUNCTIONAL_ASSESSMENT: ACTIVITIES ARE NOT PREVENTED

## 2022-03-04 ASSESSMENT — PAIN DESCRIPTION - FREQUENCY
FREQUENCY: INTERMITTENT

## 2022-03-04 ASSESSMENT — PAIN DESCRIPTION - PAIN TYPE
TYPE: SURGICAL PAIN
TYPE: ACUTE PAIN
TYPE: ACUTE PAIN
TYPE: SURGICAL PAIN
TYPE: SURGICAL PAIN

## 2022-03-04 ASSESSMENT — PAIN DESCRIPTION - PROGRESSION
CLINICAL_PROGRESSION: GRADUALLY WORSENING
CLINICAL_PROGRESSION: NOT CHANGED
CLINICAL_PROGRESSION: GRADUALLY IMPROVING
CLINICAL_PROGRESSION: NOT CHANGED
CLINICAL_PROGRESSION: GRADUALLY IMPROVING

## 2022-03-04 ASSESSMENT — PAIN DESCRIPTION - ONSET
ONSET: ON-GOING
ONSET: GRADUAL
ONSET: ON-GOING
ONSET: ON-GOING
ONSET: GRADUAL
ONSET: ON-GOING
ONSET: ON-GOING

## 2022-03-04 ASSESSMENT — PAIN DESCRIPTION - ORIENTATION
ORIENTATION: LOWER

## 2022-03-04 ASSESSMENT — PAIN DESCRIPTION - LOCATION
LOCATION: ABDOMEN

## 2022-03-04 NOTE — PLAN OF CARE
Problem: Discharge Planning:  Goal: Discharged to appropriate level of care  Description: Discharged to appropriate level of care  3/4/2022 0648 by Aruna Carter RN  Outcome: Ongoing  3/3/2022 1737 by Hyun Jackson RN  Outcome: Ongoing     Problem: Fluid Volume - Imbalance:  Goal: Absence of postpartum hemorrhage signs and symptoms  Description: Absence of postpartum hemorrhage signs and symptoms  3/4/2022 0648 by Aruna Carter RN  Outcome: Ongoing  3/3/2022 1737 by Hyun Jackson RN  Outcome: Ongoing  Goal: Absence of imbalanced fluid volume signs and symptoms  Description: Absence of imbalanced fluid volume signs and symptoms  3/4/2022 0648 by Aruna Carter RN  Outcome: Ongoing  3/3/2022 1737 by Hyun Jackson RN  Outcome: Ongoing     Problem: Infection - Surgical Site:  Goal: Will show no infection signs and symptoms  Description: Will show no infection signs and symptoms  3/4/2022 0648 by Aruna Carter RN  Outcome: Ongoing  3/3/2022 1737 by Hyun Jackson RN  Outcome: Ongoing     Problem: Mood - Altered:  Goal: Mood stable  Description: Mood stable  3/4/2022 0648 by Aruna Carter RN  Outcome: Ongoing  3/3/2022 1737 by Hyun Jackson RN  Outcome: Ongoing     Problem: Nausea/Vomiting:  Goal: Absence of nausea/vomiting  Description: Absence of nausea/vomiting  3/4/2022 0648 by Aruna Carter RN  Outcome: Met This Shift  3/3/2022 1737 by Hyun Jackson RN  Outcome: Ongoing     Problem: Pain - Acute:  Goal: Pain level will decrease  Description: Pain level will decrease  3/4/2022 0648 by Aruna Carter RN  Outcome: Ongoing  3/3/2022 1737 by Hyun Jackson RN  Outcome: Ongoing     Problem: Urinary Retention:  Goal: Urinary elimination within specified parameters  Description: Urinary elimination within specified parameters  3/4/2022 0648 by Aruna Carter RN  Outcome: Ongoing  3/3/2022 1737 by Hyun Jackson RN  Outcome: Ongoing     Problem: Venous Thromboembolism:  Goal: Will show no signs or symptoms of venous thromboembolism  Description: Will show no signs or symptoms of venous thromboembolism  3/3/2022 1737 by Uzma Adams RN  Outcome: Ongoing  Goal: Absence of signs or symptoms of impaired coagulation  Description: Absence of signs or symptoms of impaired coagulation  3/4/2022 0648 by Patricia Jc RN  Outcome: Ongoing  3/3/2022 1737 by Uzma Adams RN  Outcome: Ongoing

## 2022-03-04 NOTE — FLOWSHEET NOTE
Report received, care assumed, pt sitting in bed helping change infants diaper. Garcia intact draining clear yellow urine. No complaints at this time. POC discussed with pt and , voiced understanding.

## 2022-03-04 NOTE — LACTATION NOTE
This note was copied from a baby's chart. [employer] confirmed coverage and I delivered a Spectra S2 breast pump.

## 2022-03-04 NOTE — LACTATION NOTE
This note was copied from a baby's chart. LC called to room to assist with breastfeeding. Infant sleepy after circumcision. Attempted to gently wake infant with no success. LC and FOB helped mother hand express about 2-3 ml of colostrum onto spoon and spoon fed sleepy infant. Infant then placed back skin to skin with mother. Infant remained sleepy and disinterested in feeding. Encouraged parents to watch for early feeding cues and place infant to breast when cues are shown.  If infant still sleepy after 3 hours, try wake up techniques to help infant wake and go to the breast.

## 2022-03-04 NOTE — FLOWSHEET NOTE
Patient requesting one more percocet for break through pain now. I called dr Aric Fontaine and updated her on this request and updated on patient pain. One time order for percocet received. See orders for details.

## 2022-03-04 NOTE — PROGRESS NOTES
Department of Obstetrics and Gynecology   Postpartum Rounds    SUBJECTIVE:  Pain is controlled with non-steroidal anti-inflammatory drugs. The patient is tolerating regular diet. She is ambulating. Her lochia is normal.    OBJECTIVE:  Vital Signs: /71   Pulse 71   Temp 98.1 °F (36.7 °C) (Oral)   Resp 18   Ht 5' 8\" (1.727 m)   Wt 158 lb (71.7 kg)   SpO2 99%   Breastfeeding Unknown   BMI 24.02 kg/m²   Appearance/Psychiatric: awake, alert, cooperative, no apparent distress, appears stated age  Constitutional: The patient is well nourished. Cardiovascular: She does not have edema. Respiratory: Respiratory effort is normal.  Gastrointestinal: Soft, appropriately tender, uterine fundus is firm below umbilicus  The incision is clean, dry and intact  Extremities: nontender to palpation    LABS / IMAGING:  Component      Latest Ref Rng & Units 3/4/2022 3/3/2022           6:06 AM  3:49 AM   WBC      4.0 - 11.0 K/uL 11.0 9.0   RBC      4.00 - 5.20 M/uL 3.36 (L) 3.78 (L)   Hemoglobin Quant      12.0 - 16.0 g/dL 11.1 (L) 12.4   Hematocrit      36.0 - 48.0 % 32.2 (L) 35.9 (L)   MCV      80.0 - 100.0 fL 95.8 95.0   MCH      26.0 - 34.0 pg 33.0 32.8   MCHC      31.0 - 36.0 g/dL 34.5 34.6   RDW      12.4 - 15.4 % 13.0 12.9   Platelet Count      787 - 450 K/uL 159 164   MPV      5.0 - 10.5 fL 9.2 9.2   Neutrophils %      %  71.4   Lymphocyte %      %  16.7   Monocytes %      %  10.6   Eosinophils %      %  0.7   Basophils %      %  0.6   Neutrophils Absolute      1.7 - 7.7 K/uL  6.4   Lymphocytes Absolute      1.0 - 5.1 K/uL  1.5   Monocytes Absolute      0.0 - 1.3 K/uL  0.9   Eosinophils Absolute      0.0 - 0.6 K/uL  0.1   Basophils Absolute      0.0 - 0.2 K/uL  0.1       ASSESSMENT:    Postoperative Day 1 s/p Planned Primary  for breech presentation     PLAN:   1. Advance postoperative care as tolerated  2. Discharge home on Postpartum Day 2  3. Return to office in 6 weeks   4.  Postpartum

## 2022-03-04 NOTE — PLAN OF CARE
Education provided on Eliquis medication and s/s of bleeding. Reasons to return to the ED are discussed. Reports she will f/u with her PCP.  Patient denies further questions at this time.    Problem: Discharge Planning:  Goal: Discharged to appropriate level of care  Description: Discharged to appropriate level of care  3/4/2022 0958 by Bay Vick RN  Outcome: Ongoing  3/4/2022 0648 by Otilia Cherry RN  Outcome: Ongoing     Problem: Fluid Volume - Imbalance:  Goal: Absence of postpartum hemorrhage signs and symptoms  Description: Absence of postpartum hemorrhage signs and symptoms  3/4/2022 0958 by Bay Vick RN  Outcome: Ongoing  3/4/2022 0648 by Otilia Cherry RN  Outcome: Ongoing  Goal: Absence of imbalanced fluid volume signs and symptoms  Description: Absence of imbalanced fluid volume signs and symptoms  3/4/2022 0958 by Bay Vick RN  Outcome: Ongoing  3/4/2022 0648 by Otilia Cherry RN  Outcome: Ongoing     Problem: Infection - Surgical Site:  Goal: Will show no infection signs and symptoms  Description: Will show no infection signs and symptoms  3/4/2022 0958 by Bay Vick RN  Outcome: Ongoing  3/4/2022 0648 by Otilia Cherry RN  Outcome: Ongoing     Problem: Mood - Altered:  Goal: Mood stable  Description: Mood stable  3/4/2022 0958 by Bay Vick RN  Outcome: Ongoing  3/4/2022 0648 by Otilia Cherry RN  Outcome: Ongoing     Problem: Nausea/Vomiting:  Goal: Absence of nausea/vomiting  Description: Absence of nausea/vomiting  3/4/2022 0958 by Bay Vick RN  Outcome: Ongoing  3/4/2022 0648 by Otilia Cherry RN  Outcome: Met This Shift     Problem: Pain - Acute:  Goal: Pain level will decrease  Description: Pain level will decrease  3/4/2022 0958 by Bay Vick RN  Outcome: Ongoing  3/4/2022 0648 by Otilia Cherry RN  Outcome: Ongoing     Problem: Urinary Retention:  Goal: Urinary elimination within specified parameters  Description: Urinary elimination within specified parameters  3/4/2022 0958 by Bay Vick RN  Outcome: Ongoing  3/4/2022 0648 by Otilia Cherry RN  Outcome: Ongoing     Problem: Venous Thromboembolism:  Goal: Will show no signs or symptoms of venous thromboembolism  Description: Will show no signs or symptoms of venous thromboembolism  Outcome: Ongoing  Goal: Absence of signs or symptoms of impaired coagulation  Description: Absence of signs or symptoms of impaired coagulation  3/4/2022 0958 by Jeffrey Mckeon RN  Outcome: Ongoing  3/4/2022 0648 by Bay Payne RN  Outcome: Ongoing

## 2022-03-04 NOTE — FLOWSHEET NOTE
Garcia discontinued, danny care done. Fresh underwear, pad and bed bad completed. Instructed patient to call RN to assist when getting up for the first time to go to bathroom.

## 2022-03-04 NOTE — LACTATION NOTE
This note was copied from a baby's chart. LC to room. Discussed positioning and latch on techniques with parents. Mother independently able to position and latch infant with good technique. Infant with SRS and audible swallows. Infant will be circumcised today. Discussed feeding as often as infant wants before circ, as he will most likely be sleepy afterwards. Discussed cluster feeding that is typical for night 2. Encouraged mother to rest in between feedings as much as possible today.

## 2022-03-05 PROCEDURE — 6360000002 HC RX W HCPCS: Performed by: OBSTETRICS & GYNECOLOGY

## 2022-03-05 PROCEDURE — 96374 THER/PROPH/DIAG INJ IV PUSH: CPT

## 2022-03-05 PROCEDURE — 96372 THER/PROPH/DIAG INJ SC/IM: CPT

## 2022-03-05 PROCEDURE — 6370000000 HC RX 637 (ALT 250 FOR IP): Performed by: OBSTETRICS & GYNECOLOGY

## 2022-03-05 PROCEDURE — 1220000000 HC SEMI PRIVATE OB R&B

## 2022-03-05 RX ORDER — IBUPROFEN 800 MG/1
800 TABLET ORAL EVERY 8 HOURS PRN
Qty: 40 TABLET | Refills: 1 | Status: SHIPPED | OUTPATIENT
Start: 2022-03-05

## 2022-03-05 RX ORDER — DOCUSATE SODIUM 100 MG/1
100 CAPSULE, LIQUID FILLED ORAL 2 TIMES DAILY
Qty: 60 CAPSULE | Refills: 1 | Status: SHIPPED | OUTPATIENT
Start: 2022-03-05

## 2022-03-05 RX ORDER — POLYETHYLENE GLYCOL 3350 17 G/17G
17 POWDER, FOR SOLUTION ORAL DAILY
Status: DISCONTINUED | OUTPATIENT
Start: 2022-03-05 | End: 2022-03-06 | Stop reason: HOSPADM

## 2022-03-05 RX ORDER — OXYCODONE HYDROCHLORIDE AND ACETAMINOPHEN 5; 325 MG/1; MG/1
1 TABLET ORAL EVERY 6 HOURS PRN
Qty: 28 TABLET | Refills: 0 | Status: SHIPPED | OUTPATIENT
Start: 2022-03-05 | End: 2022-03-12

## 2022-03-05 RX ADMIN — OXYCODONE AND ACETAMINOPHEN 1 TABLET: 5; 325 TABLET ORAL at 09:44

## 2022-03-05 RX ADMIN — ENOXAPARIN SODIUM 40 MG: 100 INJECTION SUBCUTANEOUS at 18:30

## 2022-03-05 RX ADMIN — OXYCODONE AND ACETAMINOPHEN 1 TABLET: 5; 325 TABLET ORAL at 22:29

## 2022-03-05 RX ADMIN — IBUPROFEN 800 MG: 800 TABLET, FILM COATED ORAL at 14:18

## 2022-03-05 RX ADMIN — OXYCODONE AND ACETAMINOPHEN 1 TABLET: 5; 325 TABLET ORAL at 02:49

## 2022-03-05 RX ADMIN — POLYETHYLENE GLYCOL 3350 17 G: 17 POWDER, FOR SOLUTION ORAL at 09:36

## 2022-03-05 RX ADMIN — PRENATAL VIT W/ FE FUMARATE-FA TAB 27-0.8 MG 1 TABLET: 27-0.8 TAB at 07:58

## 2022-03-05 RX ADMIN — IBUPROFEN 800 MG: 800 TABLET, FILM COATED ORAL at 02:49

## 2022-03-05 RX ADMIN — OXYCODONE AND ACETAMINOPHEN 2 TABLET: 5; 325 TABLET ORAL at 14:22

## 2022-03-05 RX ADMIN — DOCUSATE SODIUM 100 MG: 100 CAPSULE, LIQUID FILLED ORAL at 07:58

## 2022-03-05 RX ADMIN — DOCUSATE SODIUM 100 MG: 100 CAPSULE, LIQUID FILLED ORAL at 21:10

## 2022-03-05 RX ADMIN — IBUPROFEN 800 MG: 800 TABLET, FILM COATED ORAL at 22:29

## 2022-03-05 ASSESSMENT — PAIN DESCRIPTION - FREQUENCY
FREQUENCY: INTERMITTENT

## 2022-03-05 ASSESSMENT — PAIN - FUNCTIONAL ASSESSMENT
PAIN_FUNCTIONAL_ASSESSMENT: ACTIVITIES ARE NOT PREVENTED

## 2022-03-05 ASSESSMENT — PAIN DESCRIPTION - ORIENTATION
ORIENTATION: LOWER

## 2022-03-05 ASSESSMENT — PAIN SCALES - GENERAL
PAINLEVEL_OUTOF10: 6
PAINLEVEL_OUTOF10: 4
PAINLEVEL_OUTOF10: 8
PAINLEVEL_OUTOF10: 8
PAINLEVEL_OUTOF10: 1
PAINLEVEL_OUTOF10: 8
PAINLEVEL_OUTOF10: 0
PAINLEVEL_OUTOF10: 0
PAINLEVEL_OUTOF10: 8
PAINLEVEL_OUTOF10: 0

## 2022-03-05 ASSESSMENT — PAIN DESCRIPTION - DESCRIPTORS
DESCRIPTORS: CRAMPING;DISCOMFORT
DESCRIPTORS: BURNING;CRAMPING
DESCRIPTORS: BURNING;CRAMPING
DESCRIPTORS: ACHING;DISCOMFORT
DESCRIPTORS: CRAMPING;DISCOMFORT
DESCRIPTORS: ACHING;DISCOMFORT

## 2022-03-05 ASSESSMENT — PAIN DESCRIPTION - PROGRESSION
CLINICAL_PROGRESSION: NOT CHANGED
CLINICAL_PROGRESSION: GRADUALLY WORSENING
CLINICAL_PROGRESSION: RAPIDLY WORSENING
CLINICAL_PROGRESSION: GRADUALLY IMPROVING
CLINICAL_PROGRESSION: RESOLVED
CLINICAL_PROGRESSION: GRADUALLY WORSENING

## 2022-03-05 ASSESSMENT — PAIN DESCRIPTION - PAIN TYPE
TYPE: ACUTE PAIN

## 2022-03-05 ASSESSMENT — PAIN DESCRIPTION - ONSET
ONSET: GRADUAL
ONSET: GRADUAL
ONSET: ON-GOING
ONSET: GRADUAL
ONSET: SUDDEN
ONSET: SUDDEN

## 2022-03-05 ASSESSMENT — PAIN DESCRIPTION - LOCATION
LOCATION: BREAST;ABDOMEN
LOCATION: INCISION
LOCATION: ABDOMEN
LOCATION: INCISION
LOCATION: ABDOMEN;BREAST
LOCATION: ABDOMEN;INCISION

## 2022-03-05 NOTE — FLOWSHEET NOTE
Pt c/o pain incisional pain rated a 4/10 on pain scale. Pt medicated with 1 percocet po after medication reviewed including side effects. Pt currently rates pain a 1 and satisfied.

## 2022-03-05 NOTE — FLOWSHEET NOTE
Pt c/o incisional pain rated a 3/10 pt requested and medicated with 1 percocet po after medication reviewed along with side effects.

## 2022-03-05 NOTE — FLOWSHEET NOTE
Report received and RN took over mother ans infant care at this time. White board updated and call light in each.

## 2022-03-05 NOTE — PLAN OF CARE
VS stable. Incision THU with no redness or edema noted. Brushing noted. Bleeding WNL pt c/o nausea x1 today after pt mediated for pain. Pt up voiding in BR. Laxative given to pt this am with no results out. No c/o calf pain. Pt breastfeeding well. Infant cluster feeding. Plan is for d/c home tomorrow.

## 2022-03-05 NOTE — FLOWSHEET NOTE
VS stable and pt verbalized po pain meds keeping pain managable. Fundus firm at -1U, small rubra noted no clots. ABD incision THU with surgical glue. Noted some bruising Pt aware of need to complete and turn in depression scale. Pt plans on d/c home tomorrow. Mother verbalized breastfeeding going well but c/o soar nipples. Pt given lanolin and instructed on use. Incentive spirometer given and reviewed with pt/fob. Pt demonstrated understanding.

## 2022-03-05 NOTE — FLOWSHEET NOTE
Pt c/o severe cramping and incisional pain. Pt requested pain medication/ pt rates pain a 8/10. Pt medicated with motrin and 2 percocet po.

## 2022-03-05 NOTE — DISCHARGE SUMMARY
Department of Obstetrics and Gynecology  Postpartum Discharge Summary      Admit Date: 3/3/2022    Admit Diagnosis: S/P primary low transverse  [Z98.891]    Discharge Date: 3/6/2022 (Summary placed the day prior to discharge for planning purposes and updated as needed)    Discharge Diagnoses: primary C section       Medication List      START taking these medications    docusate sodium 100 MG capsule  Commonly known as: COLACE  Take 1 capsule by mouth 2 times daily     ibuprofen 800 MG tablet  Commonly known as: ADVIL;MOTRIN  Take 1 tablet by mouth every 8 hours as needed for Pain     oxyCODONE-acetaminophen 5-325 MG per tablet  Commonly known as: PERCOCET  Take 1 tablet by mouth every 6 hours as needed for Pain for up to 7 days. CONTINUE taking these medications    calcium carbonate 500 MG Tabs tablet  Commonly known as: OSCAL     PRENATAL 1 PO     vitamin B-12 1000 MCG tablet  Commonly known as: CYANOCOBALAMIN     vitamin D 50 MCG (2000) Caps capsule           Where to Get Your Medications      These medications were sent to [place] [id number]    Phone: [phone]   · docusate sodium 100 MG capsule  · ibuprofen 800 MG tablet  · oxyCODONE-acetaminophen 5-325 MG per tablet         Service: Obstetrics    Consults: none    Significant Diagnostic Studies: none    Postpartum complications: none     Condition at Discharge: good    Hospital Course: uncomplicated    Discharge Instructions: Activity: no lifting, Driving, or Strenuous exercise for 6 weeks, no sex for 6 weeks and, otherwise, as tolerated    Diet: regular diet    Instructions: No intercourse and nothing in the vagina for 6 weeks. Do not drive while using pain medications.  Keep any wounds clean and dry    Discharge to: Home    Disposition / Follow up: Return to office in 6 weeks    Home Health Nurse visit within 24-48 h if qualifies    [place] Data:  Weight   Information for the patient's : Annetterip Baker Boy Sondra Jaiden [[id number]]        [employer]pgars   Information for the patient's :   Delores Olivas [[id number]]         Home with mother    Electronically signed by Jorgito Phillips MD on 3/5/2022 at 8:56 AM

## 2022-03-05 NOTE — PLAN OF CARE
Problem: Discharge Planning:  Goal: Discharged to appropriate level of care  Description: Discharged to appropriate level of care  3/4/2022 2328 by Elyssa Overton RN  Outcome: Ongoing  3/4/2022 0958 by Adriana Ayala RN  Outcome: Ongoing     Problem: Fluid Volume - Imbalance:  Goal: Absence of postpartum hemorrhage signs and symptoms  Description: Absence of postpartum hemorrhage signs and symptoms  3/4/2022 2328 by Elyssa Overton RN  Outcome: Ongoing  3/4/2022 0958 by Adriana Ayala RN  Outcome: Ongoing  Goal: Absence of imbalanced fluid volume signs and symptoms  Description: Absence of imbalanced fluid volume signs and symptoms  3/4/2022 2328 by Elyssa Overton RN  Outcome: Ongoing  3/4/2022 0958 by Adriana Ayala RN  Outcome: Ongoing     Problem: Infection - Surgical Site:  Goal: Will show no infection signs and symptoms  Description: Will show no infection signs and symptoms  3/4/2022 2328 by Elyssa Overton RN  Outcome: Ongoing  3/4/2022 0958 by Adriana Ayala RN  Outcome: Ongoing     Problem: Mood - Altered:  Goal: Mood stable  Description: Mood stable  3/4/2022 2328 by Elyssa Overton RN  Outcome: Ongoing  3/4/2022 0958 by Adriana Ayala RN  Outcome: Ongoing     Problem: Nausea/Vomiting:  Goal: Absence of nausea/vomiting  Description: Absence of nausea/vomiting  3/4/2022 2328 by Elyssa Overton RN  Outcome: Ongoing  3/4/2022 0958 by Adriana Ayala RN  Outcome: Ongoing     Problem: Pain - Acute:  Goal: Pain level will decrease  Description: Pain level will decrease  3/4/2022 2328 by Elyssa Overton RN  Outcome: Ongoing  3/4/2022 0958 by Adriana Ayala RN  Outcome: Ongoing     Problem: Urinary Retention:  Goal: Urinary elimination within specified parameters  Description: Urinary elimination within specified parameters  3/4/2022 2328 by Elyssa Overton RN  Outcome: Ongoing  3/4/2022 0958 by Adriana Ayala RN  Outcome: Ongoing     Problem: Venous Thromboembolism:  Goal: Will show no signs or symptoms of venous thromboembolism  Description: Will show no signs or symptoms of venous thromboembolism  3/4/2022 2328 by Samantha Hua RN  Outcome: Ongoing  3/4/2022 0958 by Merle Dawn RN  Outcome: Ongoing  Goal: Absence of signs or symptoms of impaired coagulation  Description: Absence of signs or symptoms of impaired coagulation  3/4/2022 2328 by Samantha Hua RN  Outcome: Ongoing  3/4/2022 0958 by Merle Dawn RN  Outcome: Ongoing

## 2022-03-05 NOTE — PROGRESS NOTES
Department of Obstetrics and Gynecology   Postpartum Rounds    SUBJECTIVE:  Pain is controlled with non-steroidal anti-inflammatory drugs. The patient is tolerating regular diet. She is ambulating. Her lochia is normal. Working with breastfeeding. OBJECTIVE:  Vital Signs: /75   Pulse 60   Temp 97.5 °F (36.4 °C) (Oral)   Resp 20   Ht 5' 8\" (1.727 m)   Wt 158 lb (71.7 kg)   SpO2 99%   Breastfeeding Unknown   BMI 24.02 kg/m²   Appearance/Psychiatric: awake, alert, cooperative, no apparent distress, appears stated age  Constitutional: The patient is well nourished. Cardiovascular: She does not have edema. Respiratory: Respiratory effort is normal.  Gastrointestinal: Soft, appropriately tender, uterine fundus is firm below umbilicus  The incision is clean, dry and intact  Extremities: nontender to palpation    LABS / IMAGING:  Component      Latest Ref Rng & Units 3/4/2022 3/3/2022           6:06 AM  3:49 AM   WBC      4.0 - 11.0 K/uL 11.0 9.0   RBC      4.00 - 5.20 M/uL 3.36 (L) 3.78 (L)   Hemoglobin Quant      12.0 - 16.0 g/dL 11.1 (L) 12.4   Hematocrit      36.0 - 48.0 % 32.2 (L) 35.9 (L)   MCV      80.0 - 100.0 fL 95.8 95.0   MCH      26.0 - 34.0 pg 33.0 32.8   MCHC      31.0 - 36.0 g/dL 34.5 34.6   RDW      12.4 - 15.4 % 13.0 12.9   Platelet Count      990 - 450 K/uL 159 164   MPV      5.0 - 10.5 fL 9.2 9.2   Neutrophils %      %  71.4   Lymphocyte %      %  16.7   Monocytes %      %  10.6   Eosinophils %      %  0.7   Basophils %      %  0.6   Neutrophils Absolute      1.7 - 7.7 K/uL  6.4   Lymphocytes Absolute      1.0 - 5.1 K/uL  1.5   Monocytes Absolute      0.0 - 1.3 K/uL  0.9   Eosinophils Absolute      0.0 - 0.6 K/uL  0.1   Basophils Absolute      0.0 - 0.2 K/uL  0.1       ASSESSMENT:    Postoperative Day 2 s/p Planned Primary  for breech presentation     PLAN:   1. Advance postoperative care as tolerated  2. Discharge home on Postpartum Day 3  3.  Return to office in 6 weeks   4.  Postpartum instructions reviewed and all patient's Questions answered    Electronically signed by Sinan Alvarez MD on 3/5/2022 at 6:53 AM

## 2022-03-05 NOTE — FLOWSHEET NOTE
Mother in bed holding infant skin to skin. She has no concerns or complaints at this time. Plan of care reviewed and all questions answered.

## 2022-03-06 VITALS
TEMPERATURE: 97.4 F | HEIGHT: 68 IN | DIASTOLIC BLOOD PRESSURE: 71 MMHG | SYSTOLIC BLOOD PRESSURE: 115 MMHG | WEIGHT: 158 LBS | OXYGEN SATURATION: 99 % | BODY MASS INDEX: 23.95 KG/M2 | HEART RATE: 66 BPM | RESPIRATION RATE: 16 BRPM

## 2022-03-06 PROCEDURE — 6370000000 HC RX 637 (ALT 250 FOR IP): Performed by: OBSTETRICS & GYNECOLOGY

## 2022-03-06 RX ADMIN — OXYCODONE AND ACETAMINOPHEN 1 TABLET: 5; 325 TABLET ORAL at 00:01

## 2022-03-06 RX ADMIN — IBUPROFEN 800 MG: 800 TABLET, FILM COATED ORAL at 09:33

## 2022-03-06 RX ADMIN — POLYETHYLENE GLYCOL 3350 17 G: 17 POWDER, FOR SOLUTION ORAL at 09:12

## 2022-03-06 RX ADMIN — PRENATAL VIT W/ FE FUMARATE-FA TAB 27-0.8 MG 1 TABLET: 27-0.8 TAB at 09:12

## 2022-03-06 RX ADMIN — DOCUSATE SODIUM 100 MG: 100 CAPSULE, LIQUID FILLED ORAL at 09:12

## 2022-03-06 RX ADMIN — OXYCODONE AND ACETAMINOPHEN 1 TABLET: 5; 325 TABLET ORAL at 09:33

## 2022-03-06 ASSESSMENT — PAIN SCALES - GENERAL
PAINLEVEL_OUTOF10: 8
PAINLEVEL_OUTOF10: 4
PAINLEVEL_OUTOF10: 3

## 2022-03-06 ASSESSMENT — PAIN DESCRIPTION - ORIENTATION: ORIENTATION: LOWER

## 2022-03-06 ASSESSMENT — PAIN DESCRIPTION - DESCRIPTORS: DESCRIPTORS: BURNING;CRAMPING

## 2022-03-06 ASSESSMENT — PAIN DESCRIPTION - LOCATION: LOCATION: ABDOMEN;BREAST

## 2022-03-06 ASSESSMENT — PAIN DESCRIPTION - PAIN TYPE: TYPE: ACUTE PAIN

## 2022-03-06 ASSESSMENT — PAIN DESCRIPTION - ONSET: ONSET: GRADUAL

## 2022-03-06 ASSESSMENT — PAIN DESCRIPTION - FREQUENCY: FREQUENCY: INTERMITTENT

## 2022-03-06 ASSESSMENT — PAIN - FUNCTIONAL ASSESSMENT: PAIN_FUNCTIONAL_ASSESSMENT: ACTIVITIES ARE NOT PREVENTED

## 2022-03-06 ASSESSMENT — PAIN DESCRIPTION - PROGRESSION: CLINICAL_PROGRESSION: GRADUALLY IMPROVING

## 2022-03-06 NOTE — FLOWSHEET NOTE
Called pt, LVM  Per Dr. Jade Kanner: José Miguel Mclean you please call and give her the results highlighted.  She has Mitral valve prolapse which is what it sounds like she has been seeing cardiology in [place] for --- just need routine echoes yearly.  If she has symptoms, then can take a closer look at it (via [facility]). \" Pt continues to complain of sore nipples. Assisted with infant latch. Infant with wide gape initially then pulls off to nipple only. Instructed how to break latch when infant is on nipple only and wait for wide gape before attempting to latch infant. States latch does feel better.

## 2022-03-06 NOTE — FLOWSHEET NOTE
C/o sore nipples (left worse than right). Skin intact. Reinforced importance of positioning infant nose to nipple, belly to belly, waiting for wide open mouth, and bringing baby onto breast to ensure a deep latch. Hydrogel provided.

## 2022-03-06 NOTE — FLOWSHEET NOTE
Postpartum and infant care teaching completed and forms signed by patient. Copy witnessed by RN and given to patient. Patient verbalized understanding of all teaching points. Patient plans to follow-up with [facility] Provider as instructed. Patient verbalizes understanding of discharge instructions and denies further questions. ID bands checked. Mother's ID band and one of baby's ID bands removed and taped to footprint sheet, signed by patient and witnessed by RN. Patient discharged in stable condition accompanied by family/guardian. Discharged in wheelchair, holding baby in arms.

## (undated) DEVICE — SUTURE ABSRB BRAID COAT UD CTX 3-0 36IN VCRL J980H

## (undated) DEVICE — LARGE, DISPOSABLE ALEXIS O C-SECTION PROTECTOR - RETRACTOR: Brand: ALEXIS ® O C-SECTION PROTECTOR - RETRACTOR

## (undated) DEVICE — CANISTER, RIGID, 3000CC: Brand: MEDLINE INDUSTRIES, INC.

## (undated) DEVICE — GLOVE,SURG,SENSICARE SLT,LF,PF,6.5: Brand: MEDLINE

## (undated) DEVICE — Device

## (undated) DEVICE — SKIN AFFIX SURG ADHESIVE 72/CS 0.55ML: Brand: MEDLINE

## (undated) DEVICE — SPONGE LAP W18XL18IN WHT COT 4 PLY FLD STRUNG RADPQ DISP ST

## (undated) DEVICE — SUTURE MCRYL SZ 0 L36IN ABSRB VLT L48MM CTX 1/2 CIR Y398H

## (undated) DEVICE — CHLORAPREP 26ML ORANGE

## (undated) DEVICE — BAG,SPONGE COUNTER,BLUE,50/BX,5BX/CS: Brand: MEDLINE

## (undated) DEVICE — SOLUTION IV IRRIG POUR BRL 0.9% SODIUM CHL 2F7124

## (undated) DEVICE — ADHESIVE SKIN CLOSURE 0.7CC TOP MICROBIAL APPL DERMBND ADV

## (undated) DEVICE — POOLE SUCTION INSTRUMENT,RIGID: Brand: ARGYLE

## (undated) DEVICE — CATHETER TRAY 16 FR 5 CC FOL ANTIREFLX SAMPLING PRT DOVER

## (undated) DEVICE — 9165 UNIVERSAL PATIENT PLATE: Brand: 3M™

## (undated) DEVICE — COVER LT HNDL BLU PLAS

## (undated) DEVICE — SAFESECURE,SECUREMENT,FOLEY CATH,STERILE: Brand: MEDLINE

## (undated) DEVICE — Device: Brand: PORTEX

## (undated) DEVICE — SUTURE COAT VCRL SZ 0 L36IN ABSRB VLT CTX L48MM TAPERPOINT J370H

## (undated) DEVICE — GARMENT,MEDLINE,DVT,INT,CALF,MED, GEN2: Brand: MEDLINE

## (undated) DEVICE — SUTURE VCRL SZ 4-0 L27IN ABSRB UD L60MM KS STR REV CUT NDL J662H